# Patient Record
Sex: FEMALE | Race: WHITE | NOT HISPANIC OR LATINO | Employment: FULL TIME | URBAN - METROPOLITAN AREA
[De-identification: names, ages, dates, MRNs, and addresses within clinical notes are randomized per-mention and may not be internally consistent; named-entity substitution may affect disease eponyms.]

---

## 2020-04-24 ENCOUNTER — HOSPITAL ENCOUNTER (OUTPATIENT)
Dept: GENERAL RADIOLOGY | Facility: HOSPITAL | Age: 20
Discharge: HOME | End: 2020-04-24

## 2020-04-24 ENCOUNTER — HOSPITAL ENCOUNTER (OUTPATIENT)
Dept: ULTRASOUND IMAGING | Facility: HOSPITAL | Age: 20
Discharge: HOME | End: 2020-04-24
Admitting: PEDIATRICS

## 2020-04-24 ENCOUNTER — TRANSCRIBE ORDERS (OUTPATIENT)
Dept: ADMINISTRATIVE | Facility: HOSPITAL | Age: 20
End: 2020-04-24

## 2020-04-24 DIAGNOSIS — N39.0 URINARY TRACT INFECTION WITHOUT HEMATURIA, SITE UNSPECIFIED: ICD-10-CM

## 2020-04-24 DIAGNOSIS — Z87.440 PERSONAL HISTORY UTI: Primary | ICD-10-CM

## 2020-04-24 DIAGNOSIS — N39.0 URINARY TRACT INFECTION WITHOUT HEMATURIA, SITE UNSPECIFIED: Primary | ICD-10-CM

## 2020-04-24 PROCEDURE — 76775 US EXAM ABDO BACK WALL LIM: CPT

## 2020-04-24 PROCEDURE — 74018 RADEX ABDOMEN 1 VIEW: CPT

## 2021-08-15 ENCOUNTER — HOSPITAL ENCOUNTER (EMERGENCY)
Facility: HOSPITAL | Age: 21
Discharge: HOME | End: 2021-08-15
Attending: EMERGENCY MEDICINE | Admitting: EMERGENCY MEDICINE

## 2021-08-15 ENCOUNTER — APPOINTMENT (OUTPATIENT)
Dept: GENERAL RADIOLOGY | Facility: HOSPITAL | Age: 21
End: 2021-08-15

## 2021-08-15 VITALS
DIASTOLIC BLOOD PRESSURE: 95 MMHG | SYSTOLIC BLOOD PRESSURE: 151 MMHG | TEMPERATURE: 100 F | RESPIRATION RATE: 18 BRPM | OXYGEN SATURATION: 98 % | HEART RATE: 111 BPM

## 2021-08-15 DIAGNOSIS — U07.1 COVID-19: Primary | ICD-10-CM

## 2021-08-15 LAB — QT INTERVAL: 350 MS

## 2021-08-15 PROCEDURE — 71045 X-RAY EXAM CHEST 1 VIEW: CPT

## 2021-08-15 PROCEDURE — 99282 EMERGENCY DEPT VISIT SF MDM: CPT

## 2021-08-15 PROCEDURE — 93010 ELECTROCARDIOGRAM REPORT: CPT | Performed by: INTERNAL MEDICINE

## 2021-08-15 PROCEDURE — 93005 ELECTROCARDIOGRAM TRACING: CPT | Performed by: EMERGENCY MEDICINE

## 2021-08-15 PROCEDURE — 99282 EMERGENCY DEPT VISIT SF MDM: CPT | Performed by: EMERGENCY MEDICINE

## 2021-08-15 RX ORDER — ONDANSETRON 4 MG/1
4 TABLET, ORALLY DISINTEGRATING ORAL 4 TIMES DAILY PRN
Qty: 10 TABLET | Refills: 0 | Status: SHIPPED | OUTPATIENT
Start: 2021-08-15 | End: 2022-01-28

## 2021-08-15 RX ORDER — ACETAMINOPHEN 500 MG
1000 TABLET ORAL ONCE
Status: DISCONTINUED | OUTPATIENT
Start: 2021-08-15 | End: 2021-08-15

## 2021-08-15 RX ORDER — OMEPRAZOLE 20 MG/1
20 CAPSULE, DELAYED RELEASE ORAL DAILY
COMMUNITY

## 2021-08-15 RX ORDER — BUPROPION HYDROCHLORIDE 150 MG/1
150 TABLET ORAL DAILY
COMMUNITY

## 2021-08-15 NOTE — DISCHARGE INSTRUCTIONS
If you develop nausea prescription has been sent to the [facility] pharmacy in [place] for antinausea medication.  Please take ibuprofen and Tylenol as needed for fever and body aches.  Please stay at home according to quarantine instructions.  Return to the emergency room if you develop persistent difficulty breathing, uncontrollable vomiting, persistent severe chest pain or for any other concerns.

## 2022-01-10 NOTE — ED PROVIDER NOTES
Contacted and conveyed MD message to mother; verbalized understanding. Mother is aware RBC is slightly high at 5.32.  Keep in the inbasket for tomorrow results.     Component      Latest Ref Rng & Units 1/10/2022   WBC      4.2 - 11.0 K/mcL 5.3   RBC      3.90 - 5.30 mil/mcL 5.32 (H)   HGB      13.0 - 17.0 g/dL 16.2   HCT      39.0 - 51.0 % 45.4   MCV      78.0 - 100.0 fl 85.3   MCH      26.0 - 34.0 pg 30.5   MCHC      32.0 - 36.5 g/dL 35.7   RDW-CV      11.0 - 15.0 % 11.8   RDW-SD      35.0 - 47.0 fL 37.6   PLT      140 - 450 K/mcL 234   Neutrophil      % 54   LYMPH      % 30   MONO      % 14   EOSIN      % 2   BASO      % 0   Immature Granulocytes      % 0   Absolute Neutrophil      1.8 - 8.0 K/mcL 2.8   Absolute Lymph      1.5 - 6.5 K/mcL 1.6   Absolute Mono      0.0 - 0.8 K/mcL 0.7   Absolute Eos      0.0 - 0.5 K/mcL 0.1   Absolute Baso      0.0 - 0.2 K/mcL 0.0   Absolute Immature Granulocytes      0.0 - 0.2 K/mcL 0.0   Mononucleosis Screen      Negative Negative        Subjective   History of Present Illness  21-year-old female presents to the emergency room complaining of fever cough and pain with coughing x1 day.  She used a home Covid test today and tested positive.  She says she called her primary care doctor's office tonight because she was having pain with coughing and they told her to go to the ER.  She denies pain otherwise denies any dyspnea denies nausea or vomiting.  T-max at home was 102 range.  Review of Systems   Constitutional: Positive for chills and fever.   Respiratory:        As noted in HPI   Cardiovascular:        As noted in HPI   Gastrointestinal: Negative for abdominal pain, nausea and vomiting.       Past Medical History:   Diagnosis Date   • Anxiety    • Depression    • GERD (gastroesophageal reflux disease)    • PTSD (post-traumatic stress disorder)        No Known Allergies    History reviewed. No pertinent surgical history.    History reviewed. No pertinent family history.    Social History     Socioeconomic History   • Marital status: Single     Spouse name: Not on file   • Number of children: Not on file   • Years of education: Not on file   • Highest education level: Not on file   Tobacco Use   • Smoking status: Never Smoker   Substance and Sexual Activity   • Alcohol use: Yes   • Drug use: Never           Objective    ED Triage Vitals [08/15/21 0044]   Temp Heart Rate Resp BP SpO2   100 °F (37.8 °C) 111 18 151/95 98 %      Temp src Heart Rate Source Patient Position BP Location FiO2 (%)   Oral Monitor Lying Right arm --       Physical Exam  INITIAL VITAL SIGNS: Reviewed by me.  Pulse ox normal  GENERAL: Alert. Well developed and well nourished. No respiratory distress.  HEAD: Normocephalic.   EYES: No conjunctival injection.  ENT: Oral mucosa is moist.  NECK: Supple. Full range of motion.  RESPIRATORY: Non-labored respirations.  EXTREMITIES: No deformity.  SKIN: Warm and dry. No rashes. No diaphoresis.  NEUROLOGIC: Alert. Normal  gait      Procedures  EKG           EKG time/Interp time: 0059/0103  Rhythm/Rate: Sinus rhythm rate of 103  P waves and OR: Normal P waves with normal OR interval  QRS, axis: Normal QRS duration with normal axis  ST and T waves: No ST elevations or depressions    Independently interpreted by me contemporaneously with treatment           ED Course  ED Course as of Aug 15 0126   Sun Aug 15, 2021   0113 Very well-appearing 21-year-old female with no significant medical history who was diagnosed with home Covid test today at home.  She says that because she was having some discomfort in her chest with coughing she called her doctor's office and they told her to come to the ER.  She is very specific that she is not having chest pain or discomfort any other time except during a coughing fit.  She is not having any dyspnea no nausea no vomiting she does have some fever and chills and general malaise.  This all sounds very expected for Covid she is not tachypneic and has normal O2 sat.  Abdomen abundance of caution we will check an EKG and a chest x-ray but I do not think she needs any other work-up at this time.    [RO]   0119 I have reviewed the x-ray myself and there does not appear to be any consolidation consistent with bacterial pneumonia.    [RO]      ED Course User Index  [RO] Junior Rossi MD                                           [facility]    Final diagnoses:   COVID-19       ED Disposition  ED Disposition     ED Disposition Condition Comment    Discharge Yasemin Wilson MD  [address] [address] [phone]    Call   To schedule follow-up appointment for 10 to 14 days from now         Medication List      New Prescriptions    ondansetron ODT 4 MG disintegrating tablet  Commonly known as: ZOFRAN-ODT  Place 1 tablet on the tongue 4 (Four) Times a Day As Needed for Nausea or Vomiting.           Where to Get Your Medications      These medications were sent to [facility]/pharmacy #[id number] -  [place], [place] - [address] - [phone]  - [phone] FX  [address], [place] [address]    Phone: [phone]   · ondansetron ODT 4 MG disintegrating tablet          Junior Rossi MD  08/15/21 012

## 2022-01-28 ENCOUNTER — APPOINTMENT (OUTPATIENT)
Dept: CT IMAGING | Facility: HOSPITAL | Age: 22
End: 2022-01-28

## 2022-01-28 ENCOUNTER — HOSPITAL ENCOUNTER (EMERGENCY)
Facility: HOSPITAL | Age: 22
Discharge: HOME | End: 2022-01-28
Attending: EMERGENCY MEDICINE | Admitting: EMERGENCY MEDICINE

## 2022-01-28 VITALS
BODY MASS INDEX: 32.27 KG/M2 | SYSTOLIC BLOOD PRESSURE: 145 MMHG | OXYGEN SATURATION: 100 % | HEIGHT: 64 IN | HEART RATE: 100 BPM | RESPIRATION RATE: 16 BRPM | DIASTOLIC BLOOD PRESSURE: 95 MMHG | WEIGHT: 189 LBS | TEMPERATURE: 98.3 F

## 2022-01-28 DIAGNOSIS — S06.0X0A CONCUSSION WITHOUT LOSS OF CONSCIOUSNESS, INITIAL ENCOUNTER: ICD-10-CM

## 2022-01-28 DIAGNOSIS — S09.90XA INJURY OF HEAD, INITIAL ENCOUNTER: Primary | ICD-10-CM

## 2022-01-28 LAB — B-HCG UR QL: NEGATIVE

## 2022-01-28 PROCEDURE — 70450 CT HEAD/BRAIN W/O DYE: CPT

## 2022-01-28 PROCEDURE — 99282 EMERGENCY DEPT VISIT SF MDM: CPT | Performed by: NURSE PRACTITIONER

## 2022-01-28 PROCEDURE — 99283 EMERGENCY DEPT VISIT LOW MDM: CPT

## 2022-01-28 PROCEDURE — 81025 URINE PREGNANCY TEST: CPT | Performed by: NURSE PRACTITIONER

## 2022-01-28 RX ORDER — ONDANSETRON 4 MG/1
4 TABLET, ORALLY DISINTEGRATING ORAL EVERY 8 HOURS PRN
Qty: 15 TABLET | Refills: 0 | Status: SHIPPED | OUTPATIENT
Start: 2022-01-28